# Patient Record
Sex: FEMALE | Race: BLACK OR AFRICAN AMERICAN | NOT HISPANIC OR LATINO | URBAN - NONMETROPOLITAN AREA
[De-identification: names, ages, dates, MRNs, and addresses within clinical notes are randomized per-mention and may not be internally consistent; named-entity substitution may affect disease eponyms.]

---

## 2020-08-17 ENCOUNTER — IMPORTED ENCOUNTER (OUTPATIENT)
Dept: URBAN - NONMETROPOLITAN AREA CLINIC 1 | Facility: CLINIC | Age: 75
End: 2020-08-17

## 2020-08-17 PROCEDURE — 92014 COMPRE OPH EXAM EST PT 1/>: CPT

## 2020-08-17 NOTE — PATIENT DISCUSSION
Cataract(s)-Visually significant cataract OU.-Cataract(s) causing symptomatic impairment of visual function not correctable with a tolerable change in glasses or contact lenses lighting or non-operative means resulting in specific activity limitations and/or participation restrictions including but not limited to reading viewing television driving or meeting vocational or recreational needs. -Expectation is clearer vision and functional improvement in symptoms as well as reduced glare disability after cataract removal.-Order IOLMaster and OPD today. -Recommend standard/traditional based on today's OPD testing and lifestyle questionnaire.-All questions were answered regarding surgery including pre and post-op medications appointments activity restrictions and anesthetic usage.-The risks benefits and alternatives and special risk factors for the patient were discussed in detail including but not limited to: bleeding infection retinal detachment vitreous loss problems with the implant and possible need for additional surgery.-Although rare the possibility of complete vision loss was discussed.-The possible need for glasses post-operatively was discussed.-Order medical clearance exam based on history of DM-Patient elects to proceed with cataract surgery OD . Will schedule at patient's convenience and re-evaluate OS  in the future.

## 2020-08-18 PROBLEM — H25.13: Noted: 2020-08-18

## 2020-08-18 PROBLEM — E11.9: Noted: 2018-03-13

## 2020-08-18 PROBLEM — H52.13: Noted: 2020-08-18

## 2020-08-23 ENCOUNTER — IMPORTED ENCOUNTER (OUTPATIENT)
Dept: URBAN - NONMETROPOLITAN AREA CLINIC 1 | Facility: CLINIC | Age: 75
End: 2020-08-23

## 2020-08-23 PROBLEM — H25.13: Noted: 2020-08-23

## 2020-08-23 PROBLEM — H52.13: Noted: 2020-08-23

## 2020-08-23 PROBLEM — E11.9: Noted: 2020-08-23

## 2020-08-24 ENCOUNTER — IMPORTED ENCOUNTER (OUTPATIENT)
Dept: URBAN - NONMETROPOLITAN AREA CLINIC 1 | Facility: CLINIC | Age: 75
End: 2020-08-24

## 2020-08-24 PROBLEM — I10: Noted: 2020-08-24

## 2020-08-24 PROBLEM — Z01.818: Noted: 2020-08-24

## 2020-08-24 PROBLEM — E11.9: Noted: 2020-08-24

## 2020-09-04 ENCOUNTER — IMPORTED ENCOUNTER (OUTPATIENT)
Dept: URBAN - NONMETROPOLITAN AREA CLINIC 1 | Facility: CLINIC | Age: 75
End: 2020-09-04

## 2020-09-04 PROCEDURE — 66984 XCAPSL CTRC RMVL W/O ECP: CPT

## 2020-09-04 PROCEDURE — 92136 OPHTHALMIC BIOMETRY: CPT

## 2020-09-04 PROCEDURE — 99024 POSTOP FOLLOW-UP VISIT: CPT

## 2020-09-04 NOTE — PATIENT DISCUSSION
Same day s/p PCIOL OD-Pt doing well s/p PCIOL. -Continue post-op gtts according to instruction sheet and sleep with eye shield over eye for 7 nights.-Avoid bending at the waist lifting anything over 5lbs and dirty or jerald environments.

## 2020-09-10 ENCOUNTER — IMPORTED ENCOUNTER (OUTPATIENT)
Dept: URBAN - NONMETROPOLITAN AREA CLINIC 1 | Facility: CLINIC | Age: 75
End: 2020-09-10

## 2020-09-10 PROBLEM — Z98.41: Noted: 2020-09-10

## 2020-09-10 PROBLEM — E11.9: Noted: 2020-08-24

## 2020-09-10 PROBLEM — H25.812: Noted: 2020-09-10

## 2020-09-10 PROCEDURE — 99024 POSTOP FOLLOW-UP VISIT: CPT

## 2020-09-14 PROBLEM — Z98.41: Noted: 2020-09-14

## 2020-09-14 PROBLEM — H25.812: Noted: 2020-09-14

## 2020-09-14 PROBLEM — E11.9: Noted: 2020-08-24

## 2020-09-18 ENCOUNTER — IMPORTED ENCOUNTER (OUTPATIENT)
Dept: URBAN - NONMETROPOLITAN AREA CLINIC 1 | Facility: CLINIC | Age: 75
End: 2020-09-18

## 2020-09-18 PROBLEM — E11.9: Noted: 2020-08-24

## 2020-09-18 PROBLEM — Z98.41: Noted: 2020-09-18

## 2020-09-18 PROBLEM — Z98.42: Noted: 2020-09-18

## 2020-09-18 PROCEDURE — 92136 OPHTHALMIC BIOMETRY: CPT

## 2020-09-18 PROCEDURE — 66984 XCAPSL CTRC RMVL W/O ECP: CPT

## 2020-09-18 PROCEDURE — 99024 POSTOP FOLLOW-UP VISIT: CPT

## 2020-09-18 NOTE — PATIENT DISCUSSION
Same day s/p PCIOL OS-Pt doing well s/p PCIOL. -Continue post-op gtts according to instruction sheet and sleep with eye shield over eye for 7 nights.-Avoid bending at the waist lifting anything over 5lbs and dirty or jerald environments. 2 week s/p PCIOL OD-Pt doing well at 2 week s/p PCIOL. -Continue post-op gtts according to instruction sheet.

## 2020-09-29 ENCOUNTER — IMPORTED ENCOUNTER (OUTPATIENT)
Dept: URBAN - NONMETROPOLITAN AREA CLINIC 1 | Facility: CLINIC | Age: 75
End: 2020-09-29

## 2020-09-29 PROBLEM — Z01.818: Noted: 2020-09-29

## 2020-09-29 PROBLEM — E11.9: Noted: 2020-09-29

## 2020-09-29 PROBLEM — I10: Noted: 2020-09-29

## 2020-09-29 PROCEDURE — 99024 POSTOP FOLLOW-UP VISIT: CPT

## 2020-09-29 PROCEDURE — 66840 REMOVAL OF LENS MATERIAL: CPT

## 2020-09-29 NOTE — PATIENT DISCUSSION
1 week s/p PCIOL OS-Pt doing well at 1 week s/p PCIOL OS.-Continue post-op gtts according to instruction sheet.-Okay to resume usual activities and d/c eye shield. 3 week s/p PCIOL OD-Retained cortex fragment inferior region in angle at 6:00 causing corneal edema and reduction in vision. -Continue post-op gtts according to instruction sheet.-Administered atropine OD today.

## 2021-09-15 ENCOUNTER — PREPPED CHART (OUTPATIENT)
Dept: RURAL CLINIC 3 | Facility: CLINIC | Age: 76
End: 2021-09-15

## 2021-09-15 ENCOUNTER — IMPORTED ENCOUNTER (OUTPATIENT)
Dept: URBAN - NONMETROPOLITAN AREA CLINIC 1 | Facility: CLINIC | Age: 76
End: 2021-09-15

## 2021-09-15 PROBLEM — Z96.1: Noted: 2021-09-15

## 2021-09-15 PROBLEM — E11.9: Noted: 2021-09-15

## 2021-09-15 PROBLEM — H52.4: Noted: 2021-09-15

## 2021-09-15 PROCEDURE — 92250 FUNDUS PHOTOGRAPHY W/I&R: CPT

## 2021-09-15 PROCEDURE — 99214 OFFICE O/P EST MOD 30 MIN: CPT

## 2021-09-15 PROCEDURE — 92015 DETERMINE REFRACTIVE STATE: CPT

## 2021-09-15 NOTE — PATIENT DISCUSSION
NIDDM sans Retinopathy Discussed diagnosis with patient. Discussed the risk of diabetic damage of the retina with potential vision loss and the importance of routine follow-up. Emphasized strict blood sugar control. Photos done today; baseline with no BDR OU. Continue to monitor. RTC in 1 year with photosP/C IOL OUDiscussed diagnosis in detail with patient. Both intraocular implants in place and stable. Continue to monitor. Presbyopia OUDiscussed refractive status in detail with patient. New glasses Rx given today but ok to continue using OTC readers. Continue to monitor.

## 2022-02-02 ASSESSMENT — VISUAL ACUITY
OD_SC: 20/20-1
OS_SC: 20/25-1

## 2022-04-09 ASSESSMENT — VISUAL ACUITY
OD_PH: 20/60-1
OD_CC: 20/80+1
OS_AM: 20/25
OS_AM: 20/25
OD_CC: 20/20-
OD_PAM: 20/25
OS_CC: 20/50
OS_CC: 20/50
OS_CC: 20/25
OD_CC: 20/100
OD_SC: 20/50
OS_CC: 20/100-1
OS_CC: 20/25
OD_PH: 20/30
OD_CC: 20/70
OS_PH: 20/40
OS_SC: 20/60
OD_GLARE: 20/50
OS_SC: 20/50
OS_AM: 20/25
OD_PH: 20/30
OS_GLARE: 20/400
OD_CC: 20/70
OD_PH: 20/50
OS_CC: 20/25-2
OD_PAM: 20/25
OS_SC: 20/50
OD_CC: 20/80+1
OD_GLARE: 20/50
OS_PH: 20/50+2
OS_CC: 20/50
OS_SC: 20/60
OS_CC: 20/25
OS_PH: 20/40
OS_PH: 20/40
OS_GLARE: 20/400
OD_CC: 20/70+2
OS_GLARE: 20/400
OD_CC: 20/40

## 2022-04-09 ASSESSMENT — TONOMETRY
OD_IOP_MMHG: 14
OS_IOP_MMHG: 16
OD_IOP_MMHG: 14
OS_IOP_MMHG: 18
OD_IOP_MMHG: 22
OS_IOP_MMHG: 16
OD_IOP_MMHG: 15
OS_IOP_MMHG: 16
OS_IOP_MMHG: 15
OD_IOP_MMHG: 21
OS_IOP_MMHG: 14
OD_IOP_MMHG: 16

## 2022-07-21 ENCOUNTER — EMERGENCY VISIT (OUTPATIENT)
Dept: RURAL CLINIC 3 | Facility: CLINIC | Age: 77
End: 2022-07-21

## 2022-07-21 DIAGNOSIS — H16.223: ICD-10-CM

## 2022-07-21 PROCEDURE — 99213 OFFICE O/P EST LOW 20 MIN: CPT

## 2022-07-21 ASSESSMENT — VISUAL ACUITY
OD_SC: 20/20
OS_SC: 20/20-1

## 2022-07-21 ASSESSMENT — TONOMETRY
OD_IOP_MMHG: 17
OS_IOP_MMHG: 16

## 2022-07-21 NOTE — PATIENT DISCUSSION
Discussed diagnosis with patient. SPK noted OU. Patient has been using AT's with no relief. Start Xiidra BID OU, Rx sent to pharmacy. Patient to call if no improvement. Continue to monitor.

## 2024-08-01 ENCOUNTER — FOLLOW UP (OUTPATIENT)
Dept: RURAL CLINIC 3 | Facility: CLINIC | Age: 79
End: 2024-08-01

## 2024-08-01 DIAGNOSIS — H16.223: ICD-10-CM

## 2024-08-01 DIAGNOSIS — H52.4: ICD-10-CM

## 2024-08-01 DIAGNOSIS — E11.9: ICD-10-CM

## 2024-08-01 DIAGNOSIS — Z96.1: ICD-10-CM

## 2024-08-01 PROCEDURE — 92250 FUNDUS PHOTOGRAPHY W/I&R: CPT

## 2024-08-01 PROCEDURE — 92015 DETERMINE REFRACTIVE STATE: CPT

## 2024-08-01 PROCEDURE — 92014 COMPRE OPH EXAM EST PT 1/>: CPT

## 2024-08-01 ASSESSMENT — VISUAL ACUITY
OD_SC: 20/20
OS_SC: 20/25
OU_SC: 20/20

## 2024-08-01 ASSESSMENT — TONOMETRY
OD_IOP_MMHG: 18
OS_IOP_MMHG: 18

## 2025-07-22 ENCOUNTER — FOLLOW UP (OUTPATIENT)
Age: 80
End: 2025-07-22

## 2025-07-22 DIAGNOSIS — H52.4: ICD-10-CM

## 2025-07-22 DIAGNOSIS — Z96.1: ICD-10-CM

## 2025-07-22 DIAGNOSIS — H16.223: ICD-10-CM

## 2025-07-22 DIAGNOSIS — E11.9: ICD-10-CM

## 2025-07-22 PROCEDURE — 92015 DETERMINE REFRACTIVE STATE: CPT

## 2025-07-22 PROCEDURE — 92250 FUNDUS PHOTOGRAPHY W/I&R: CPT

## 2025-07-22 PROCEDURE — 92014 COMPRE OPH EXAM EST PT 1/>: CPT
